# Patient Record
Sex: MALE | Race: WHITE | NOT HISPANIC OR LATINO | Employment: FULL TIME | URBAN - METROPOLITAN AREA
[De-identification: names, ages, dates, MRNs, and addresses within clinical notes are randomized per-mention and may not be internally consistent; named-entity substitution may affect disease eponyms.]

---

## 2024-03-18 ENCOUNTER — OFFICE VISIT (OUTPATIENT)
Dept: ORTHOPEDIC SURGERY | Facility: CLINIC | Age: 54
End: 2024-03-18
Payer: COMMERCIAL

## 2024-03-18 VITALS — WEIGHT: 164 LBS | BODY MASS INDEX: 24.86 KG/M2 | HEIGHT: 68 IN

## 2024-03-18 DIAGNOSIS — M79.641 RIGHT HAND PAIN: Primary | ICD-10-CM

## 2024-03-18 PROCEDURE — 99202 OFFICE O/P NEW SF 15 MIN: CPT | Performed by: ORTHOPAEDIC SURGERY

## 2024-03-18 PROCEDURE — 1036F TOBACCO NON-USER: CPT | Performed by: ORTHOPAEDIC SURGERY

## 2024-03-18 NOTE — PROGRESS NOTES
Subjective    Patient ID: Isreal Johnson is a 53 y.o. male.    Chief Complaint: Pain of the Right Index Finger     Last Surgery: No surgery found  Last Surgery Date: No surgery found    ALEIDA  Is a 53-year-old right-hand-dominant male who comes in with a complaint of right index finger pain.  He states that approximately 2 weeks ago he noticed pain near the second MCP joint and index finger PIP joint that lasted for 4 to 5 days.  He did not recall any specific trauma at home.  He does not recall 1 specific trauma at work either.  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  He has not noticed his symptoms for the past week however.    Objective   Ortho Exam  Patient is in no acute distress.  Exam of his right hand and wrist reveals a skin envelope is intact.  He has no obvious swelling or erythema near his right index finger.  He has full range of motion his right index finger.  He has no specific areas of point tenderness.  There is no locking or triggering.      Assessment/Plan   Encounter Diagnoses:  Right hand pain    Patient has right index finger pain which is now resolved.  There was no specific injury that he could recall.  At this time I informed the patient that should his symptoms recur and he notices swelling he may use over-the-counter anti-inflammatories.  He otherwise will follow-up as his symptoms dictate.